# Patient Record
Sex: MALE | Race: WHITE | NOT HISPANIC OR LATINO | Employment: OTHER | URBAN - NONMETROPOLITAN AREA
[De-identification: names, ages, dates, MRNs, and addresses within clinical notes are randomized per-mention and may not be internally consistent; named-entity substitution may affect disease eponyms.]

---

## 2017-01-09 ENCOUNTER — HOSPITAL ENCOUNTER (OUTPATIENT)
Dept: CT IMAGING | Facility: HOSPITAL | Age: 82
Discharge: HOME | End: 2017-01-09
Admitting: NURSE PRACTITIONER

## 2017-01-09 DIAGNOSIS — S09.90XD HEAD INJURY, SUBSEQUENT ENCOUNTER: ICD-10-CM

## 2017-01-09 PROCEDURE — 70450 CT HEAD/BRAIN W/O DYE: CPT

## 2017-01-12 ENCOUNTER — OFFICE VISIT (OUTPATIENT)
Dept: NEUROSURGERY | Facility: CLINIC | Age: 82
End: 2017-01-12

## 2017-01-12 VITALS
SYSTOLIC BLOOD PRESSURE: 132 MMHG | DIASTOLIC BLOOD PRESSURE: 64 MMHG | HEIGHT: 68 IN | BODY MASS INDEX: 20 KG/M2 | WEIGHT: 132 LBS

## 2017-01-12 DIAGNOSIS — S09.90XD HEAD INJURY, SUBSEQUENT ENCOUNTER: Primary | ICD-10-CM

## 2017-01-12 PROCEDURE — 99213 OFFICE O/P EST LOW 20 MIN: CPT | Performed by: NURSE PRACTITIONER

## 2017-01-12 NOTE — MR AVS SNAPSHOT
Aashish Lazo   1/12/2017 9:15 AM   Office Visit    Dept Phone:  [phone]   Encounter #:  [id number]    Provider:  MORENITA Carias   Department:  [facility] NEUROSURGERY                Your Full Care Plan              Your Updated Medication List          This list is accurate as of: 1/12/17 10:13 AM.  Always use your most recent med list.                amoxicillin-clavulanate 875-125 MG per tablet   Commonly known as:  AUGMENTIN   Take 1 tablet by mouth 2 (Two) Times a Day.       aspirin 81 MG chewable tablet       escitalopram 20 MG tablet   Commonly known as:  LEXAPRO       ferrous sulfate 325 (65 FE) MG tablet       glyBURIDE 5 MG tablet   Commonly known as:  DIAbeta       HYDROcodone-acetaminophen 5-325 MG per tablet   Commonly known as:  NORCO       lisinopril 40 MG tablet   Commonly known as:  PRINIVIL,ZESTRIL       metFORMIN 500 MG tablet   Commonly known as:  GLUCOPHAGE       simvastatin 40 MG tablet   Commonly known as:  ZOCOR               You Were Diagnosed With        Codes Comments    Head injury, subsequent encounter    -  Primary ICD-10-CM: S09.90XD  ICD-9-CM: V58.89, 959.01       Instructions     None    Patient Instructions History      Upcoming Appointments     Visit Type Date Time Department    HOSPITAL FOLLOW UP 1/12/2017  9:15 AM [facility]      MyChart Signup     Our records indicate that you have declined [facility] [employer] sign[facility]. If you would like to sign up for [employer], please email [email or web address] or call [phone] to obtain an activation code.             Other Info from Your Visit           Your Appointments     Jan 16, 2018  1:00 PM CST   Follow Up with Eddie Miner MD   [facility] NEUROSURGERY (--)    [address] [place] [address] [address]  [facility] [id number] [phone]           Arrive 15 minutes prior to appointment.              Allergies     No Known Allergies      Reason for  "Visit     Head Injury Patient is here today for check up after a hospital stay in December for a fall with a head injury, states he has been doing okay.       Vital Signs     Blood Pressure Height Weight Body Mass Index Smoking Status       132/64 (BP Location: Right arm, Patient Position: Sitting) 68\" (172.7 cm) 132 lb (59.9 kg) 20.07 kg/m2 Never Smoker       Problems and Diagnoses Noted     Head injury        "

## 2017-01-12 NOTE — PROGRESS NOTES
"    Chief complaint:   Chief Complaint   Patient presents with   • Head Injury     Patient is here today for check up after a hospital stay in December for a fall with a head injury, states he has been doing okay.         Subjective     HPI: This is a 84-year-old male gentleman who we saw in the hospital back in December after he fell carrying an air conditioner hitting his head.  There is a question of the subdural hematoma.  However [facility] that was no evidence that we could appreciate.  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  He is here in follow-up today.  He says that he is not having any headache.  Denies any nausea vomiting.  Denies any vision changes.  Denies any difficulty with gait or ambulation.  Overall he says he feels like is doing very well at this time not having issues from that the fall.    Review of Systems   Musculoskeletal: Negative.    Neurological: Negative.          Objective      Vital Signs  Visit Vitals   • /64 (BP Location: Right arm, Patient Position: Sitting)   • Ht 68\" (172.7 cm)   • Wt 132 lb (59.9 kg)   • BMI 20.07 kg/m2       Physical Exam   Constitutional: He is oriented to person, place, and time. He appears well-developed and well-nourished.   HENT:   Head: Normocephalic.   Eyes: EOM are normal. Pupils are equal, round, and reactive to light.   Neck: Normal range of motion.   Pulmonary/Chest: Effort normal.   Musculoskeletal: Normal range of motion.   Neurological: He is alert and oriented to person, place, and time. He has normal strength and normal reflexes. No cranial nerve deficit or sensory deficit. Gait normal. GCS eye subscore is 4. GCS verbal subscore is 5. GCS motor subscore is 6.   Skin: Skin is warm.   3 sutures were removed from over his right orbit.   Psychiatric: He has a normal mood and affect. His speech is normal and behavior is normal. Thought content normal. "       Results Review: CT scan of his head without contrast do not demonstrate any evidence of cranial hemorrhage.  The 2.1 cm calcification of a benign meningioma is still present but has not changed in size.          Assessment/Plan: At this point what were going to do is see Aashish back in the office in 1 year with a repeat CT scan of his head with and without contrast.  He was told that if you have any further issues to give us a call may be happy to see him again.  We will follow-up with Dr. Miner and 1 year.  BMI shows that is at a healthy weight.  He is a nonsmoker.     I discussed the patients findings and my recommendations with patient  Lavelle PASQUALE Barron, APRN  01/12/17  9:47 AM

## 2017-01-12 NOTE — LETTER
January 12, 2017     Miguelangel Smith MD  [address] [place] [address] [address]  [place] [address]    Patient: Aashish Lazo   YOB: 1932   Date of Visit: 1/12/2017       Dear Dr. Sarah MD:    Aashish Lazo was in my office today. Below are the relevant portions of my assessment and plan of care.           If you have questions, please do not hesitate to call me. I look forward to following Aashish along with you.         Sincerely,        Lavelle Barron, MORENITA        CC: No Recipients

## 2017-06-27 ENCOUNTER — HOSPITAL ENCOUNTER (EMERGENCY)
Facility: HOSPITAL | Age: 82
Discharge: HOME | End: 2017-06-27
Admitting: NURSE PRACTITIONER

## 2017-06-27 VITALS
OXYGEN SATURATION: 100 % | DIASTOLIC BLOOD PRESSURE: 67 MMHG | HEIGHT: 68 IN | SYSTOLIC BLOOD PRESSURE: 145 MMHG | HEART RATE: 82 BPM | RESPIRATION RATE: 18 BRPM | WEIGHT: 145 LBS | TEMPERATURE: 98.7 F | BODY MASS INDEX: 21.98 KG/M2

## 2017-06-27 DIAGNOSIS — T14.8XXA SUPERFICIAL INJURY OF SKIN: Primary | ICD-10-CM

## 2017-06-27 PROCEDURE — 99282 EMERGENCY DEPT VISIT SF MDM: CPT

## 2018-01-01 ENCOUNTER — HOSPITAL ENCOUNTER (OUTPATIENT)
Dept: CT IMAGING | Facility: HOSPITAL | Age: 83
Discharge: HOME | End: 2018-03-20
Admitting: NURSE PRACTITIONER

## 2018-01-01 ENCOUNTER — NURSE TRIAGE (OUTPATIENT)
Dept: CALL CENTER | Facility: HOSPITAL | Age: 83
End: 2018-01-01

## 2018-01-01 ENCOUNTER — OFFICE VISIT (OUTPATIENT)
Dept: NEUROSURGERY | Facility: CLINIC | Age: 83
End: 2018-01-01

## 2018-01-01 VITALS
SYSTOLIC BLOOD PRESSURE: 148 MMHG | WEIGHT: 140 LBS | HEIGHT: 68 IN | BODY MASS INDEX: 21.22 KG/M2 | DIASTOLIC BLOOD PRESSURE: 72 MMHG

## 2018-01-01 DIAGNOSIS — S09.90XD INJURY OF HEAD, SUBSEQUENT ENCOUNTER: ICD-10-CM

## 2018-01-01 DIAGNOSIS — IMO0001 NORMAL BODY MASS INDEX (BMI): ICD-10-CM

## 2018-01-01 DIAGNOSIS — S09.90XD HEAD INJURY, CLOSED, SUBSEQUENT ENCOUNTER: Primary | ICD-10-CM

## 2018-01-01 DIAGNOSIS — S06.5X0D TRAUMATIC SUBDURAL HEMORRHAGE WITHOUT LOSS OF CONSCIOUSNESS, SUBSEQUENT ENCOUNTER: Primary | ICD-10-CM

## 2018-01-01 DIAGNOSIS — Z78.9 NON-SMOKER: ICD-10-CM

## 2018-01-01 LAB — CREAT BLDA-MCNC: 0.8 MG/DL (ref 0.6–1.3)

## 2018-01-01 PROCEDURE — 82565 ASSAY OF CREATININE: CPT

## 2018-01-01 PROCEDURE — 70470 CT HEAD/BRAIN W/O & W/DYE: CPT

## 2018-01-01 PROCEDURE — 25010000002 IOPAMIDOL 61 % SOLUTION: Performed by: NURSE PRACTITIONER

## 2018-01-01 PROCEDURE — 99213 OFFICE O/P EST LOW 20 MIN: CPT | Performed by: NEUROLOGICAL SURGERY

## 2018-01-01 RX ORDER — LANOLIN ALCOHOL/MO/W.PET/CERES
CREAM (GRAM) TOPICAL
COMMUNITY

## 2018-01-01 RX ORDER — DOCUSATE SODIUM 100 MG/1
100 CAPSULE, LIQUID FILLED ORAL
COMMUNITY

## 2018-01-01 RX ADMIN — IOPAMIDOL 100 ML: 612 INJECTION, SOLUTION INTRAVENOUS at 09:15

## 2018-03-20 PROBLEM — S06.5X0A TRAUMATIC SUBDURAL HEMORRHAGE WITHOUT LOSS OF CONSCIOUSNESS (HCC): Status: ACTIVE | Noted: 2018-01-01

## 2018-03-20 PROBLEM — Z78.9 NON-SMOKER: Status: ACTIVE | Noted: 2018-01-01

## 2018-03-20 PROBLEM — IMO0001 NORMAL BODY MASS INDEX (BMI): Status: ACTIVE | Noted: 2018-01-01

## 2018-03-20 NOTE — PROGRESS NOTES
SUBJECTIVE:  Patient ID: Aashish Lazo is a 85 y.o. male is here today for follow-up.    Chief Complaint:head injury  Chief Complaint   Patient presents with   • Meningioma     patient had a CT scan this morning to follow up of a head injury from last year with an subdural hemorrhage and an incidental finding of a meningioma.  Patient states he is doing good without any recent falls or injury.       HPI  86 yo -year-old gentleman who we saw for question of a subdural after a fall in December 2017.  His CT scan most recently was negative for any acute changes.  He has no complaints today he says he is feeling as good as he has a long time.    The following portions of the patient's history were reviewed and updated as appropriate: allergies, current medications, past family history, past medical history, past social history, past surgical history and problem list.    OBJECTIVE:    Review of Systems   All other systems reviewed and are negative.         Physical Exam   Constitutional: He is oriented to person, place, and time. He appears well-developed and well-nourished.   HENT:   Head: Normocephalic and atraumatic.   Right Ear: Hearing normal.   Left Ear: Hearing normal.   Eyes: EOM are normal. Pupils are equal, round, and reactive to light.   Neck: Normal range of motion.   Neurological: He is alert and oriented to person, place, and time. He has normal strength and normal reflexes. No cranial nerve deficit or sensory deficit. He displays a negative Romberg sign. GCS eye subscore is 4. GCS verbal subscore is 5. GCS motor subscore is 6. He displays no Babinski's sign on the right side. He displays no Babinski's sign on the left side.   Psychiatric: His speech is normal. Judgment normal. Cognition and memory are normal.       Neurologic Exam     Mental Status   Oriented to person, place, and time.   Speech: speech is normal     Cranial Nerves     CN III, IV, VI   Pupils are equal, round, and reactive to  light.  Extraocular motions are normal.     Motor Exam     Strength   Strength 5/5 throughout.    hard of hearing    Independent Review of Radiographic Studies:       ASSESSMENT/PLAN:  Aashish is doing very well after his fall.  He seems to have no residual effects from his head trauma.  I only need see him on an as-needed basis.      1. Traumatic subdural hemorrhage without loss of consciousness, subsequent encounter    2. Injury of head, subsequent encounter    3. Non-smoker    4. Normal body mass index (BMI)            No Follow-up on file.      Eddie Miner MD

## 2018-09-23 NOTE — TELEPHONE ENCOUNTER
"    Reason for Disposition  • Call about patient who is currently hospitalized    Additional Information  • Negative: [1] Caller is not with the adult (patient) AND [2] reporting urgent symptoms  • Negative: Lab result questions  • Negative: Medication questions  • Negative: Caller cannot be reached by phone  • Negative: Caller has already spoken to PCP or another triager  • Negative: RN needs further essential information from caller in order to complete triage  • Negative: Requesting regular office appointment  • Negative: Lab calling with strep throat test results and triager can call in prescription  • Negative: Lab calling with urinalysis test results and triager can call in prescription  • Negative: Medication questions  • Negative: ED call to PCP  • Negative: Physician call to PCP    Answer Assessment - Initial Assessment Questions  1. REASON FOR CALL or QUESTION: \"What is your reason for calling today?\" or \"How can I best help you?\" or \"What question do you have that I can help answer?\"      Arti WELSH at [facility] to speak with Dr Smith    Answer Assessment - Initial Assessment Questions  1. REASON FOR CALL or QUESTION: \"What is your reason for calling today?\" or \"How can I best  help you?\" or \"What question do you have that I can help answer?\"      Dr Smith on call  2. CALLER: Document the source of call. (e.g., laboratory, patient).      Arti [address] at [facility]    Protocols used: PCP CALL - NO TRIAGE-ADULT-, INFORMATION ONLY CALL-ADULT-      "